# Patient Record
Sex: MALE | Race: WHITE | URBAN - METROPOLITAN AREA
[De-identification: names, ages, dates, MRNs, and addresses within clinical notes are randomized per-mention and may not be internally consistent; named-entity substitution may affect disease eponyms.]

---

## 2024-01-17 ENCOUNTER — TELEPHONE (OUTPATIENT)
Facility: CLINIC | Age: 64
End: 2024-01-17

## 2024-01-17 ENCOUNTER — OFFICE VISIT (OUTPATIENT)
Facility: CLINIC | Age: 64
End: 2024-01-17

## 2024-01-17 VITALS
WEIGHT: 212 LBS | HEART RATE: 73 BPM | BODY MASS INDEX: 32.13 KG/M2 | SYSTOLIC BLOOD PRESSURE: 112 MMHG | HEIGHT: 68 IN | DIASTOLIC BLOOD PRESSURE: 75 MMHG

## 2024-01-17 DIAGNOSIS — R10.13 EPIGASTRIC PAIN: ICD-10-CM

## 2024-01-17 DIAGNOSIS — Z12.11 COLON CANCER SCREENING: ICD-10-CM

## 2024-01-17 DIAGNOSIS — Z86.010 HISTORY OF COLON POLYPS: Primary | ICD-10-CM

## 2024-01-17 DIAGNOSIS — Z86.010 PERSONAL HISTORY OF COLONIC POLYPS: Primary | ICD-10-CM

## 2024-01-17 PROCEDURE — 99204 OFFICE O/P NEW MOD 45 MIN: CPT | Performed by: INTERNAL MEDICINE

## 2024-01-17 PROCEDURE — 3074F SYST BP LT 130 MM HG: CPT | Performed by: INTERNAL MEDICINE

## 2024-01-17 PROCEDURE — 3078F DIAST BP <80 MM HG: CPT | Performed by: INTERNAL MEDICINE

## 2024-01-17 PROCEDURE — 3008F BODY MASS INDEX DOCD: CPT | Performed by: INTERNAL MEDICINE

## 2024-01-17 RX ORDER — TRAMADOL HYDROCHLORIDE 50 MG/1
50 TABLET ORAL 4 TIMES DAILY
COMMUNITY

## 2024-01-17 RX ORDER — LOSARTAN POTASSIUM 50 MG/1
50 TABLET ORAL DAILY
COMMUNITY
Start: 2023-11-28

## 2024-01-17 RX ORDER — NALOXONE HYDROCHLORIDE 4 MG/.1ML
1 SPRAY NASAL
COMMUNITY
Start: 2022-10-31

## 2024-01-17 RX ORDER — PREGABALIN 75 MG/1
75 CAPSULE ORAL 2 TIMES DAILY
COMMUNITY
Start: 2024-01-16

## 2024-01-17 NOTE — PROGRESS NOTES
Bryn Britton is a 63 year old male.    HPI:       The patient is a 63 year old male who is otherwise generally pretty healthy who presents with a gurgling sensation in the upper abdomen as well as for colon screening/history of colon polyp.    Patient reports prior colonoscopy about 4 to 5 years ago.  Reports he has 1 healthy bowel movement per day which are sometimes a little harder than usual but has been making dietary adjustments increasing fiber.  He does take a fiber supplement.  He has noted gurgling sensation in the upper abdomen.  He denies any family history of GI malignancy.          HISTORY:  History reviewed. No pertinent past medical history.   History reviewed. No pertinent surgical history.   History reviewed. No pertinent family history.   Social History:   Social History     Socioeconomic History    Marital status:    Tobacco Use    Smoking status: Never    Smokeless tobacco: Never   Substance and Sexual Activity    Alcohol use: Yes     Comment: Occasional    Drug use: Never        Medications (Active prior to today's visit):  Current Outpatient Medications   Medication Sig Dispense Refill    losartan 50 MG Oral Tab Take 1 tablet (50 mg total) by mouth daily.      Naloxone HCl 4 MG/0.1ML Nasal Liquid 1 spray by Nasal route.      pregabalin 75 MG Oral Cap Take 1 capsule (75 mg total) by mouth 2 (two) times daily.      traMADol 50 MG Oral Tab Take 1 tablet (50 mg total) by mouth 4 (four) times daily.      polyethylene glycol, PEG 3350-KCl-NaBcb-NaCl-NaSulf, 236 g Oral Recon Soln Take 4,000 mL by mouth once for 1 dose. As directed by GI clinic instructions. 4000 mL 0       Allergies:  No Known Allergies      ROS:   The patient denies any chest pain or shortness of breath,  No neurologic or dermatologic symptoms.     PHYSICAL EXAM:   Blood pressure 112/75, pulse 73, height 5' 8\" (1.727 m), weight 212 lb (96.2 kg).    The patient appears their stated age and is in no acute distress  HEENT-  anicteric sclera, neck no lymphadnopathy, OP- clear with no masses or lesions  Chest- Clear bilaterally, no wheezing,  Heart- regular rate, no murmur or gallop  Abdomen- Soft and nontender, nondistended  Ext- no clubbing or cyanosis  Skin- no rashes or lesions  Neuro- appropriate response, alert, no confusion  .  ASSESSMENT/PLAN:   Assessment     Colorectal cancer screening  History of colon polyp  Abdominal discomfort/gurgling sensation    Discussed next steps in evaluation, patient does have a history of colon polyp and colon cancer screening input.  Also been experiencing epigastric gurgling sensation.  We discussed proceeding ahead with a colonoscopy and EGD for workup and evaluation.  Risks and benefits discussed.    We discussed for his harder stools increase fluids and fiber, also like him to cut back on caffeine products which can sometimes cause a gurgling sensation.    Plan  Colonoscopy and EGD  -Dietary adjustments  -Increase fluids  -Pathology caffeine and dairy products         Orders This Visit:  No orders of the defined types were placed in this encounter.      Meds This Visit:  Requested Prescriptions     Signed Prescriptions Disp Refills    polyethylene glycol, PEG 3350-KCl-NaBcb-NaCl-NaSulf, 236 g Oral Recon Soln 4000 mL 0     Sig: Take 4,000 mL by mouth once for 1 dose. As directed by GI clinic instructions.       Imaging & Referrals:  None       Ponce Drew MD  [facility]

## 2024-01-17 NOTE — TELEPHONE ENCOUNTER
Scheduled for:  Colonoscopy 63332 EGD [id number]  Provider Name:  Dr Drew  Date:  5/13/2024  Location:  [facility]  Sedation:  MAC  Time:  12:30 pm. (pt is aware to arrive at 11:30 am)  Prep:  Split Golytely  Meds/Allergies Reconciled?:  Physician Reviewed  Diagnosis with codes:    Hx of Colon Polyps Z86.010  Epigastric pain R10.13  Was patient informed to call insurance with codes (Y/N):  Yes  Referral sent?:  Referral was sent at the time of electronic surgical scheduling.  EM or [facility] notified?:  I sent an electronic request to Endo Scheduling and received a confirmation today.  Medication Orders:  Pt is aware to NOT take iron pills, herbal meds and diet supplements for 7 days before exam. Also to NOT take any form of alcohol, recreational drugs and any forms of ED meds 24 hours before exam.   Misc Orders:  N/A   Further instructions given by staff:  I discussed the prep instructions with the patient which he verbally understood. I provided patient with prep instruction's sheet in office.      Patient was informed about the new cancellation policy for his/her procedure. Patient was also given a copy of the cancellation policy at the time of the appointment and verbalized understanding.

## 2024-01-19 ENCOUNTER — TELEPHONE (OUTPATIENT)
Facility: CLINIC | Age: 64
End: 2024-01-19

## 2024-01-22 NOTE — TELEPHONE ENCOUNTER
Patient's wife Jing called to reschedule colonoscopy and EGD with Dr. Drew. Please refer to SAMIRA dated 1/19/2024.    Rescheduled for:  Colonoscopy 66681 EGD 02050  Provider Name:  Dr. Drew  Date: FROM 5/13/2024 TO 5/17/2024  Location:  [facility]  Sedation:  MAC  Time:  FROM 12:30 PM TO 9:00 AM (pt is aware to arrive at 8:00 AM)  Prep:  Golytely  Meds/Allergies Reconciled?:  Physician Reviewed  Diagnosis with codes:  Hx: Colon polyps Z86.010; Epigastric pain R10.13  Was patient informed to call insurance with codes (Y/N):  Yes  Referral sent?:  Referral was sent at the time of electronic surgical scheduling.  EM or [facility] notified?:  I sent an electronic request to Endo Scheduling and received a confirmation today.  Medication Orders:  Pt is aware to NOT take iron pills, herbal meds and diet supplements for 7 days before exam. Also to NOT take any form of alcohol, recreational drugs and any forms of ED meds 24 hours before exam.   Misc Orders:  N/A   Further instructions given by staff:  I discussed the prep instructions with the patient's wife Jing which she verbally understood and is aware that I will mail the instructions today.

## 2024-01-22 NOTE — TELEPHONE ENCOUNTER
Called patient's wife Jing - rescheduled colonoscopy and EGD with Dr. Drew for 5/17/2024 at [facility].    Please refer to SAMIRA dated 1/17/2024 for scheduling orders.    SAMIRA closed.

## 2024-05-17 ENCOUNTER — HOSPITAL ENCOUNTER (OUTPATIENT)
Age: 64
Setting detail: HOSPITAL OUTPATIENT SURGERY
Discharge: HOME | End: 2024-05-17
Attending: INTERNAL MEDICINE | Admitting: INTERNAL MEDICINE

## 2024-05-17 ENCOUNTER — ANESTHESIA (OUTPATIENT)
Dept: ENDOSCOPY | Age: 64
End: 2024-05-17

## 2024-05-17 ENCOUNTER — ANESTHESIA EVENT (OUTPATIENT)
Dept: ENDOSCOPY | Age: 64
End: 2024-05-17

## 2024-05-17 VITALS
DIASTOLIC BLOOD PRESSURE: 96 MMHG | WEIGHT: 210 LBS | HEART RATE: 62 BPM | SYSTOLIC BLOOD PRESSURE: 138 MMHG | RESPIRATION RATE: 9 BRPM | HEIGHT: 68 IN | BODY MASS INDEX: 31.83 KG/M2 | OXYGEN SATURATION: 98 %

## 2024-05-17 DIAGNOSIS — R10.13 EPIGASTRIC PAIN: ICD-10-CM

## 2024-05-17 DIAGNOSIS — Z86.010 PERSONAL HISTORY OF COLONIC POLYPS: ICD-10-CM

## 2024-05-17 PROCEDURE — 45385 COLONOSCOPY W/LESION REMOVAL: CPT | Performed by: INTERNAL MEDICINE

## 2024-05-17 PROCEDURE — 88305 TISSUE EXAM BY PATHOLOGIST: CPT | Performed by: INTERNAL MEDICINE

## 2024-05-17 PROCEDURE — 99070 SPECIAL SUPPLIES PHYS/QHP: CPT | Performed by: INTERNAL MEDICINE

## 2024-05-17 PROCEDURE — 88312 SPECIAL STAINS GROUP 1: CPT | Performed by: INTERNAL MEDICINE

## 2024-05-17 PROCEDURE — 43239 EGD BIOPSY SINGLE/MULTIPLE: CPT | Performed by: INTERNAL MEDICINE

## 2024-05-17 RX ORDER — SODIUM CHLORIDE, SODIUM LACTATE, POTASSIUM CHLORIDE, CALCIUM CHLORIDE 600; 310; 30; 20 MG/100ML; MG/100ML; MG/100ML; MG/100ML
INJECTION, SOLUTION INTRAVENOUS CONTINUOUS
Status: DISCONTINUED | OUTPATIENT
Start: 2024-05-17 | End: 2024-05-17

## 2024-05-17 RX ORDER — NALOXONE HYDROCHLORIDE 0.4 MG/ML
0.08 INJECTION, SOLUTION INTRAMUSCULAR; INTRAVENOUS; SUBCUTANEOUS ONCE AS NEEDED
Status: DISCONTINUED | OUTPATIENT
Start: 2024-05-17 | End: 2024-05-17

## 2024-05-17 RX ORDER — OMEPRAZOLE 20 MG/1
20 CAPSULE, DELAYED RELEASE ORAL EVERY MORNING
Qty: 30 CAPSULE | Refills: 2 | Status: SHIPPED | OUTPATIENT
Start: 2024-05-17

## 2024-05-17 RX ORDER — LIDOCAINE HYDROCHLORIDE 10 MG/ML
INJECTION, SOLUTION EPIDURAL; INFILTRATION; INTRACAUDAL; PERINEURAL AS NEEDED
Status: DISCONTINUED | OUTPATIENT
Start: 2024-05-17 | End: 2024-05-17 | Stop reason: SURG

## 2024-05-17 RX ADMIN — LIDOCAINE HYDROCHLORIDE 50 MG: 10 INJECTION, SOLUTION EPIDURAL; INFILTRATION; INTRACAUDAL; PERINEURAL at 08:57:00

## 2024-05-17 RX ADMIN — SODIUM CHLORIDE, SODIUM LACTATE, POTASSIUM CHLORIDE, CALCIUM CHLORIDE: 600; 310; 30; 20 INJECTION, SOLUTION INTRAVENOUS at 09:01:00

## 2024-05-17 RX ADMIN — SODIUM CHLORIDE, SODIUM LACTATE, POTASSIUM CHLORIDE, CALCIUM CHLORIDE: 600; 310; 30; 20 INJECTION, SOLUTION INTRAVENOUS at 08:55:00

## 2024-05-17 RX ADMIN — SODIUM CHLORIDE, SODIUM LACTATE, POTASSIUM CHLORIDE, CALCIUM CHLORIDE: 600; 310; 30; 20 INJECTION, SOLUTION INTRAVENOUS at 09:21:00

## 2024-05-17 NOTE — ANESTHESIA PREPROCEDURE EVALUATION
Anesthesia PreOp Note    HPI:     Bryn Britton is a 64 year old male who presents for preoperative consultation requested by: Ponce Drew MD    Date of Surgery: 5/17/2024    Procedure(s):  COLONOSCOPY/ESOPHAGOGASTRODUODENOSCOPY (EGD)  ESOPHAGOGASTRODUODENOSCOPY (EGD)  Indication: Personal history of colonic polyps/ Epigastric pain    Relevant Problems   No relevant active problems       NPO:  Last Liquid Consumption Date: 05/17/24  Last Liquid Consumption Time: 0330  Last Solid Consumption Date: 05/16/24  Last Solid Consumption Time: 1000  Last Liquid Consumption Date: 05/17/24          History Review:  There are no problems to display for this patient.      Past Medical History:    High blood pressure       History reviewed. No pertinent surgical history.    Medications Prior to Admission   Medication Sig Dispense Refill Last Dose    losartan 50 MG Oral Tab Take 1 tablet (50 mg total) by mouth daily.   5/16/2024    pregabalin 75 MG Oral Cap Take 1 capsule (75 mg total) by mouth 2 (two) times daily.   5/17/2024    traMADol 50 MG Oral Tab Take 1 tablet (50 mg total) by mouth 4 (four) times daily.   5/17/2024    Naloxone HCl 4 MG/0.1ML Nasal Liquid 1 spray by Nasal route.        Current Facility-Administered Medications Ordered in Epic   Medication Dose Route Frequency Provider Last Rate Last Admin    lactated ringers infusion   Intravenous Continuous Ponce Drew MD         No current [facility]-ordered outpatient medications on file.       No Known Allergies    History reviewed. No pertinent family history.  Social History     Socioeconomic History    Marital status:    Tobacco Use    Smoking status: Never    Smokeless tobacco: Never   Vaping Use    Vaping status: Never Used   Substance and Sexual Activity    Alcohol use: Yes     Comment: Occasional    Drug use: Never       Available pre-op labs reviewed.             Vital Signs:  Body mass index is 31.93 kg/m².   height is 1.727 m (5' 8\") and weight  is 95.3 kg (210 lb). His blood pressure is 141/82 and his pulse is 64. His respiration is 15 and oxygen saturation is 97%.   Vitals:    05/09/24 1254 05/17/24 0755   BP:  141/82   Pulse:  64   Resp:  15   SpO2:  97%   Weight: 95.3 kg (210 lb) 95.3 kg (210 lb)   Height: 1.727 m (5' 8\") 1.727 m (5' 8\")        Anesthesia Evaluation      Airway   Mallampati: I  TM distance: >3 FB  Neck ROM: full  Dental          Pulmonary - negative ROS and normal exam   Cardiovascular - normal exam    Neuro/Psych - negative ROS     GI/Hepatic/Renal - negative ROS     Endo/Other - negative ROS   Abdominal                  Anesthesia Plan:   ASA:  2  Plan:   MAC  Plan Comments: I have discussed the anesthetic plan, major risks and alternatives with the patient and answered all questions. The patient desires to proceed with surgery and anesthesia as planned.   Increased risk of dental damage or tooth loss explained, questions answered.  Patient wants to proceed.    Informed Consent Plan and Risks Discussed With:  Patient      I have informed Bryn Britton and/or legal guardian or family member of the nature of the anesthetic plan, benefits, risks including possible dental damage if relevant, major complications, and any alternative forms of anesthetic management.   All of the patient's questions were answered to the best of my ability. The patient desires the anesthetic management as planned.  CHRIS YORK DO  5/17/2024 8:11 AM  Present on Admission:  **None**

## 2024-05-17 NOTE — ANESTHESIA POSTPROCEDURE EVALUATION
Patient: Bryn Britton    Procedure Summary       Date: 05/17/24 Room / Location: [facility] ENDOSCOPY 01 / [facility] ENDO    Anesthesia Start: 0855 Anesthesia Stop: 0933    Procedures:       COLONOSCOPY      ESOPHAGOGASTRODUODENOSCOPY (EGD) Diagnosis:       Personal history of colonic polyps      Epigastric pain      (polyp, hiatal hernia, esophagitis,)    Surgeons: Ponce Drew MD Anesthesiologist: Chris Mcdaniel DO    Anesthesia Type: MAC ASA Status: 2            Anesthesia Type: MAC    Vitals Value Taken Time   /89 05/17/24 0935   Temp  05/17/24 0938   Pulse 68 05/17/24 0938   Resp 15 05/17/24 0938   SpO2 97 % 05/17/24 0938   Vitals shown include unfiled device data.    [facility] AN Post Evaluation:   Comments: Upper incisors intact , no damage      CHRIS MCDANIEL DO  5/17/2024 9:38 AM

## 2024-05-17 NOTE — OPERATIVE REPORT
[facility] Endoscopy Report  Date of procedure-May 17, 2024    Preoperative Diagnosis:  -Colorectal cancer screening  -Dyspepsia      Postoperative Diagnosis:  -Colon polyp x 1  -Diverticulosis  -Internal hemorrhoids  -Hernia 2 cm  -Esophagitis      Procedure:    Colonoscopy   Esophagogastroduodenoscopy       Surgeon:  Ponce Drew M.D.    Anesthesia:  MAC    Technique:  After informed consent, the patient was placed in the left lateral recumbent position.  Digital rectal examination revealed no palpable intraluminal abnormalities.  An Olympus variable stiffness 190 series HD colonoscope was inserted into the rectum and advanced under direct vision by following the lumen to the cecum.  The colon was examined upon withdrawal in the left lateral position.    Following colonoscopy, an Olympus adult HD gastroscope was inserted into the hypopharynx and advanced under direct vision into the esophagus, stomach and duodenum.  The endoscope was withdrawn to the stomach where retroflexion of the annulus, body, cardia and fundus was performed.  The instrument was straightened, insufflated air and fluid were suctioned and the endoscope was withdrawn.  The procedures were well tolerated without immediate complication.      Findings:  The preparation of the colon was good.  The terminal ileum was examined for 4 cm and visually normal.  The ileocecal valve was well preserved. The visualized colonic mucosa from the cecum to the anal verge was normal with an intact vascular pattern.    Transverse colon polyp x 1, sessile 4 mm in size and cold snare removed.  No bleeding was noted from polypectomy site specimens retrieved and sent for analysis.    Diverticulosis located in the sigmoid and descending colon, no diverticulitis.    Small hemorrhoids noted on retroflexed view.    The esophagus irregular Z-line with esophagitis changes, no erosions noted.  Subtle narrowing at the GE junction but no obvious Schatzki's  ring.  Biopsies taken.  The GE junction and diaphragmatic impression were at 38 and 40 cm for a 2 cm hiatal hernia.  The stomach distended appropriately with insufflated air.  The mucosa of the stomach including cardia, fundus, gastric body and antrum were normal.  The duodenal bulb and post bulbar regions were normal.    Estimated blood loss-insignificant   Specimens-see above    Impression:  -Colon polyp x 1  -Diverticulosis  -Internal hemorrhoids  -Hernia 2 cm  -Esophagitis    Recommendations:  - Post polypectomy instructions given  - Repeat colonoscopy in 5 years  - High fiber diet for diverticular disease  - Symptomatic treatment of hemorrhoids  - Antireflux dietary and behavioral changes, PPI therapy   And follow up on biopsies of GE junction          Ponce Drew MD  5/17/2024  9:33 AM

## 2024-05-17 NOTE — ANESTHESIA POSTPROCEDURE EVALUATION
Patient: Bryn Britton    Procedure Summary       Date: 05/17/24 Room / Location: [facility] ENDOSCOPY 01 / [facility] ENDO    Anesthesia Start: 0855 Anesthesia Stop: 0933    Procedures:       COLONOSCOPY      ESOPHAGOGASTRODUODENOSCOPY (EGD) Diagnosis:       Personal history of colonic polyps      Epigastric pain      (polyp, hiatal hernia, esophagitis,)    Surgeons: Ponce Drew MD Anesthesiologist: Chris Mcdaniel DO    Anesthesia Type: MAC ASA Status: 2            Anesthesia Type: MAC    Vitals Value Taken Time   /89 05/17/24 0934   Temp  05/17/24 0936   Pulse 68 05/17/24 0936   Resp 15 05/17/24 0936   SpO2 98 % 05/17/24 0936   Vitals shown include unfiled device data.    [facility] AN Post Evaluation:   Patient Evaluated in PACU  Patient Participation: complete - patient participated  Level of Consciousness: awake  Pain Management: adequate  Airway Patency:patent  Dental exam unchanged from preop  Yes    Nausea/Vomiting: none  Cardiovascular Status: acceptable  Respiratory Status: acceptable  Postoperative Hydration acceptable      CHRIS MCDANIEL DO  5/17/2024 9:36 AM

## 2024-05-17 NOTE — DISCHARGE INSTRUCTIONS
Home Care Instructions for Colonoscopy and/or Gastroscopy with Sedation    Diet:  - Resume your regular diet as tolerated unless otherwise instructed.  - Start with light meals to minimize bloating.  - Do not drink alcohol today.    Medication:  - If you have questions about resuming your normal medications, please contact your Primary Care Physician.    Activities:  - Take it easy today. Do not return to work today.  - Do not drive today.  - Do not operate any machinery today (including kitchen equipment).    Colonoscopy:  - You may notice some rectal \"spotting\" (a little blood on the toilet tissue) for a day or two after the exam. This is normal.  - If you experience any rectal bleeding (not spotting), persistent tenderness or sharp severe abdominal pains, oral temperature over 100 degrees Fahrenheit, light-headedness or dizziness, or any other problems, contact your doctor.    Gastroscopy:  - You may have a sore throat for 2-3 days following the exam. This is normal. Gargling with warm salt water (1/2 tsp salt to 1 glass warm water) or using throat lozenges will help.  - If you experience any sharp pain in your neck, abdomen or chest, vomiting of blood, oral temperature over 100 degrees Fahrenheit, light-headedness or dizziness, or any other problems, contact your doctor.    **If unable to reach your doctor, please go to the [facility] Emergency Room**    - Your referring physician will receive a full report of your examination.  - If you do not hear from your doctor's office within two weeks of your biopsy, please call them for your results.    You may be able to see your laboratory results in [employer] between 4 and 7 business days.  In some cases, your physician may not have viewed the results before they are released to [employer].  If you have questions regarding your results contact the physician who ordered the test/exam by phone or via [employer] by choosing \"Ask a Medical Question.\"

## 2024-05-20 ENCOUNTER — TELEPHONE (OUTPATIENT)
Facility: CLINIC | Age: 64
End: 2024-05-20

## 2024-05-20 NOTE — H&P
History & Physical Examination    Patient Name: Bryn Britton  MRN: [id number]  CSN: [id number]  YOB: 1960    Diagnosis:   CRC screening  dyspepsia    No medications prior to admission.     No current facility-administered medications for this encounter.       Allergies: No Known Allergies    Past Medical History:    High blood pressure     Past Surgical History:   Procedure Laterality Date    Colonoscopy N/A 5/17/2024    Procedure: COLONOSCOPY;  Surgeon: Ponce Drew MD;  Location: [facility]     History reviewed. No pertinent family history.  Social History     Tobacco Use    Smoking status: Never    Smokeless tobacco: Never   Substance Use Topics    Alcohol use: Yes     Comment: Occasional       SYSTEM Check if Review is Normal Check if Physical Exam is Normal If not normal, please explain:   HEENT [x ] [ x]    NECK & BACK [x ] [x ]    HEART [x ] [ x]    LUNGS [x ] [ x]    ABDOMEN [x ] [x ]    UROGENITAL [ ] [ ]    EXTREMITIES [x ] [x ]    OTHER        [ x ] I have discussed the risks and benefits and alternatives with the patient/family.  They understand and agree to proceed with plan of care.  [ x ] I have reviewed the History and Physical done within the last 30 days.  Any changes noted above.    Ponce Drew MD  5/20/2024  3:40 PM

## 2024-05-20 NOTE — TELEPHONE ENCOUNTER
Health maintenance updated. Last colonoscopy done 5/17/24. 5 year recall placed into [facility], next due on 05/2029 per Dr. Drew.

## 2024-05-20 NOTE — TELEPHONE ENCOUNTER
----- Message from Ponce Drew sent at 5/20/2024  4:23 PM CDT -----  I wanted to get back to you with your colonoscopy and EGD results.      You had one colon polyp removed which was benign.  I would advise a repeat colonoscopy in 5 years to make sure no new polyps are forming.  You also have internal hemorrhoids and diverticulosis.     The upper endoscopy showed a hiatal hernia and esophagitis ( acid reflux).

## (undated) DEVICE — MEDI-VAC NON-CONDUCTIVE SUCTION TUBING: Brand: CARDINAL HEALTH

## (undated) DEVICE — 60 ML SYRINGE REGULAR TIP: Brand: MONOJECT

## (undated) DEVICE — YANKAUER,BULB TIP,W/O VENT,RIGID,STERILE: Brand: MEDLINE

## (undated) DEVICE — CONMED SCOPE SAVER BITE BLOCK, 20X27 MM: Brand: SCOPE SAVER

## (undated) DEVICE — KIT ENDO ORCAPOD 160/180/190

## (undated) DEVICE — LASSO POLYPECTOMY SNARE: Brand: LASSO

## (undated) DEVICE — MEDI-VAC NON-CONDUCTIVE SUCTION TUBING 6MM X 1.8M (6FT.) L: Brand: CARDINAL HEALTH

## (undated) DEVICE — KIT CLEAN ENDOKIT 1.1OZ GOWNX2

## (undated) DEVICE — TRAP POLYP W/ 2 SPEC TY CLR MAGNIFYING WIND

## (undated) DEVICE — GIJAW SINGLE-USE BIOPSY FORCEPS WITH NEEDLE: Brand: GIJAW

## (undated) DEVICE — Device: Brand: DUAL NARE NASAL CANNULAE FEMALE LUER CON 7FT O2 TUBE

## (undated) NOTE — LETTER
[facility]  [address], [place], [place]    Authorization for Surgical Operation and Procedure                               I hereby authorize Ponce Drew MD, my physician and his/her assistants (if applicable), which may include medical students, residents, and/or fellows, to perform the following surgical operation/ procedure and administer such anesthesia as may be determined necessary by my physician: Operation/Procedure name (s) COLONOSCOPY/ESOPHAGOGASTRODUODENOSCOPY (EGD) on Bryn Britton   2.   I recognize that during the surgical operation/procedure, unforeseen conditions may necessitate additional or different procedures than those listed above.  I, therefore, further authorize and request that the above-named surgeon, assistants, or designees perform such procedures as are, in their judgment, necessary and desirable.    3.   My surgeon/physician has discussed prior to my surgery the potential benefits, risks and side effects of this procedure; the likelihood of achieving goals; and potential problems that might occur during recuperation.  They also discussed reasonable alternatives to the procedure, including risks, benefits, and side effects related to the alternatives and risks related to not receiving this procedure.  I have had all my questions answered and I acknowledge that no guarantee has been made as to the result that may be obtained.    4.   Should the need arise during my operation/procedure, which includes change of level of care prior to discharge, I also consent to the administration of blood and/or blood products.  Further, I understand that despite careful testing and screening of blood or blood products by collecting agencies, I may still be subject to ill effects as a result of receiving a blood transfusion and/or blood products.  The following are some, but not all, of the potential risks that can occur: fever and allergic reactions, hemolytic reactions,  transmission of diseases such as Hepatitis, AIDS and Cytomegalovirus (CMV) and fluid overload.  In the event that I wish to have an autologous transfusion of my own blood, or a directed donor transfusion, I will discuss this with my physician.  Check only if Refusing Blood or Blood Products  I understand refusal of blood or blood products as deemed necessary by my physician may have serious consequences to my condition to include possible death. I hereby assume responsibility for my refusal and release the hospital, its personnel, and my physicians from any responsibility for the consequences of my refusal.    o  Refuse   5.   I authorize the use of any specimen, organs, tissues, body parts or foreign objects that may be removed from my body during the operation/procedure for diagnosis, research or teaching purposes and their subsequent disposal by hospital authorities.  I also authorize the release of specimen test results and/or written reports to my treating physician on the hospital medical staff or other referring or consulting physicians involved in my care, at the discretion of the Pathologist or my treating physician.    6.   I consent to the photographing or videotaping of the operations or procedures to be performed, including appropriate portions of my body for medical, scientific, or educational purposes, provided my identity is not revealed by the pictures or by descriptive texts accompanying them.  If the procedure has been photographed/videotaped, the surgeon will obtain the original picture, image, videotape or CD.  The hospital will not be responsible for storage, release or maintenance of the picture, image, tape or CD.    7.   I consent to the presence of a  or observers in the operating room as deemed necessary by my physician or their designees.    8.   I recognize that in the event my procedure results in extended X-Ray/fluoroscopy time, I may develop a skin reaction.    9. If  I have a Do Not Attempt Resuscitation (DNAR) order in place, that status will be suspended while in the operating room, procedural suite, and during the recovery period unless otherwise explicitly stated by me (or a person authorized to consent on my behalf). The surgeon or my attending physician will determine when the applicable recovery period ends for purposes of reinstating the DNAR order.  10. Patients having a sterilization procedure: I understand that if the procedure is successful the results will be permanent and it will therefore be impossible for me to inseminate, conceive, or bear children.  I also understand that the procedure is intended to result in sterility, although the result has not been guaranteed.   11. I acknowledge that my physician has explained sedation/analgesia administration to me including the risk and benefits I consent to the administration of sedation/analgesia as may be necessary or desirable in the judgment of my physician.    I CERTIFY THAT I HAVE READ AND FULLY UNDERSTAND THE ABOVE CONSENT TO OPERATION and/or OTHER PROCEDURE.     ____________________________________  _________________________________        ______________________________  Signature of Patient    Signature of Responsible Person                Printed Name of Responsible Person                                      ____________________________________  _____________________________                ________________________________  Signature of Witness        Date  Time         Relationship to Patient    STATEMENT OF PHYSICIAN My signature below affirms that prior to the time of the procedure; I have explained to the patient and/or his/her legal representative, the risks and benefits involved in the proposed treatment and any reasonable alternative to the proposed treatment. I have also explained the risks and benefits involved in refusal of the proposed treatment and alternatives to the proposed treatment and have  answered the patient's questions. If I have a significant financial interest in a co-management agreement or a significant financial interest in any product or implant, or other significant relationship used in this procedure/surgery, I have disclosed this and had a discussion with my patient.     _____________________________________________________              _____________________________  (Signature of Physician)                                                                                         (Date)                                   (Time)  Patient Name: Bryn Britton      : 3/31/1960      Printed: 2024     Medical Record #: [id number]                                      Page 1 of 1

## (undated) NOTE — LETTER
[facility]  Administration of Anesthesia  LORENABryn agree to be cared for by a physician anesthesiologist alone and/or with a nurse anesthetist, who is specially trained to monitor me and give me medicine to put me to sleep or keep me comfortable during my procedure    I understand that my anesthesiologist and/or anesthetist is not an employee or agent of [facility] or [facility]. He or she works for [facility]    As the patient asking for anesthesia services, I agree to:  Allow the anesthesiologist (anesthesia doctor) to give me medicine and do additional procedures as necessary. Some examples are: Starting or using an “IV” to give me medicine, fluids or blood during my procedure, and having a breathing tube placed to help me breathe when I’m asleep (intubation). In the event that my heart stops working properly, I understand that my anesthesiologist will make every effort to sustain my life, unless otherwise directed by [facility] Do Not Resuscitate documents.  Tell my anesthesia doctor before my procedure:  If I am pregnant.  The last time that I ate or drank.  iii. All of the medicines I take (including prescriptions, herbal supplements, and pills I can buy without a prescription (including street drugs/illegal medications). Failure to inform my anesthesiologist about these medicines may increase my risk of anesthetic complications.  iv.If I am allergic to anything or have had a reaction to anesthesia before.  I understand how the anesthesia medicine will help me (benefits).  I understand that with any type of anesthesia medicine there are risks:  The most common risks are: nausea, vomiting, sore throat, muscle soreness, damage to my eyes, mouth, or teeth (from breathing tube placement).  Rare risks include: remembering what happened during my procedure, allergic reactions to medications, injury to my airway, heart, lungs, vision, nerves, or  muscles and in extremely rare instances death.  My doctor has explained to me other choices available to me for my care (alternatives).  Pregnant Patients (“epidural”):  I understand that the risks of having an epidural (medicine given into my back to help control pain during labor), include itching, low blood pressure, difficulty urinating, headache or slowing of the baby’s heart. Very rare risks include infection, bleeding, seizure, irregular heart rhythms and nerve injury.  Regional Anesthesia (“spinal”, “epidural”, & “nerve blocks”):  I understand that rare but potential complications include headache, bleeding, infection, seizure, irregular heart rhythms, and nerve injury.    _____________________________________________________________________________  Patient (or Representative) Signature/Relationship to Patient  Date   Time    _____________________________________________________________________________   Name (if used)    Language/Organization   Time    _____________________________________________________________________________  Nurse Anesthetist Signature     Date   Time  _____________________________________________________________________________  Anesthesiologist Signature     Date   Time  I have discussed the procedure and information above with the patient (or patient’s representative) and answered their questions. The patient or their representative has agreed to have anesthesia services.    _____________________________________________________________________________  Witness        Date   Time  I have verified that the signature is that of the patient or patient’s representative, and that it was signed before the procedure  Patient Name: Bryn Britton     : 3/31/1960                 Printed: 2024 at 6:29 AM    Medical Record #: [id number]                                            Page 1 of 1  ----------ANESTHESIA CONSENT----------